# Patient Record
Sex: FEMALE | URBAN - METROPOLITAN AREA
[De-identification: names, ages, dates, MRNs, and addresses within clinical notes are randomized per-mention and may not be internally consistent; named-entity substitution may affect disease eponyms.]

---

## 2021-04-13 ENCOUNTER — HISTORICAL (OUTPATIENT)
Dept: ADMINISTRATIVE | Facility: HOSPITAL | Age: 51
End: 2021-04-13

## 2021-04-13 LAB — SARS-COV-2 RNA RESP QL NAA+PROBE: NOT DETECTED

## 2021-04-15 ENCOUNTER — HISTORICAL (OUTPATIENT)
Dept: SURGERY | Facility: HOSPITAL | Age: 51
End: 2021-04-15

## 2022-04-11 ENCOUNTER — HISTORICAL (OUTPATIENT)
Dept: ADMINISTRATIVE | Facility: HOSPITAL | Age: 52
End: 2022-04-11

## 2022-04-28 VITALS
BODY MASS INDEX: 28.13 KG/M2 | HEIGHT: 60 IN | WEIGHT: 143.31 LBS | SYSTOLIC BLOOD PRESSURE: 130 MMHG | DIASTOLIC BLOOD PRESSURE: 83 MMHG

## 2022-04-30 NOTE — H&P
Patient:   Marissa Hargrove             MRN: [id number]            FIN: [id number]               Age:   50 years     Sex:  Female     :  1970   Associated Diagnoses:   None   Author:   Dewayne Trejo MD      Patient presents today for surgery. There are no interval changes to formal history and physical examination performed in clinic.    Patient will undergo surgery as detailed in clinic note.    Dr. Fe Washburn

## 2022-05-04 NOTE — HISTORICAL [facility] CERNER
This is a historical note converted from [employer]. Formatting and pictures may have been removed.  Please reference Shireen for original formatting and attached multimedia. DATE OF SURGERY:?04/15/21  ?  PREOPERATIVE DIAGNOSES:  1.?right?ankle fracture  ?   POSTOPERATIVE DIAGNOSES:  ?   Same  ?   PROCEDURE: Open reduction and internal fixation of ankle fracture  ?  ATTENDING PHYSICIAN:?Dewayne Trejo MD  ?  RESIDENT PHYSICIANS: Dr Fe Washburn PGY3  ?  ANESTHESIA: Regional + General  ?  EBL: 20cc  ?  TOURNIQUET: 250 mmHg 48 min  ?  SPECIMEN: none  ?  IMPLANTS: [place] Variax ankle plate with 2.7mm locking and 3.5mm cortical screws.? Corie 2.7mm cortical screw.? Arthrex tightrope  ?  COMPLICATIONS: none  ?  DISPOSITION: To recovery room in stable condition  ?  ?  INDICATIONS FOR PROCEDURE:?Marissa Bryan?is a?50 Years?Female?who presented to our clinic for follow up assessment and definitive management of their ankle fracture.? While in clinic the risks, benefits, outcomes, and alternatives of conservative versus operative management were discussed with the patient. ?Informed consent was obtained for an open reduction and?internal fixation of the?ankle fracture.  ?  PROCEDURE IN DETAIL: ?The patient was brought into the operating room, positioned supine on the OR table. ?General anesthetic was administered by our anesthesia colleagues. ?The? ankle was prepped and draped in the usual fashion. ?A preoperative pause was performed to confirm the site and side for surgery. ?Preoperative antibiotics were given.?  ?  An incision was made over the distal fibular fracture site. ?This was carried down through subcutaneous tissue and down onto bone. ?Full-thickness flaps were elevated. ?Blunt dissection was made proximally to the fracture to protect the superficial peroneal nerve. ?The fracture was exposed and irrigated. ?It was cleaned with a combination of a?curette and small rongeurs.?  ?  FIBULAR FIXATION  ?  The  fibular fracture was anatomically reduced with a combination of lobster claw and pointed reduction forceps. ?A single 2.7 mm lag screw was placed across the fracture site in an anterior to posterior direction. ?Utilizing a zak variax fibular plate, we stabilized the fracture both proximally and distally with olives. ?Using intraoperative fluoroscopy, we confirmed that our fracture was anatomically reduced and that our hardware was in excellent position. ?We filled the plate distally with 2.7 mm locking screws and proximally with 3.5 mm locking screws.  ?  SYNDESMOSIS FIXATION  ?  Once we were satisfied with our fixation, we screened the ankle under intraoperative fluoroscopy. ?External rotation stress demonstrated widening of the syndesmosis as well as a medial clear space.? We placed an Arthrex TightRope across the syndesmosis under direct fluoroscopic visualization. ?With manual compression, the TightRope was tightened down into its final position. ?We tied the lateral aspect of the TightRope and cut the remaining sutures.?  ?  We then screened the ankle under intraoperative fluoroscopy to confirm that our fracture was reduced anatomically and that our hardware was in excellent position and well aligned. ?Under external rotation stress, there was no syndesmosis or medial clear space widening.?  ?  A thorough irrigation of the hardware and the wound was performed.? The subcutaneous tissue was closed with 2-0 Vicryl suture. ?Skin was closed with 3-0 nylon sutures.??A sterile dressing was applied and the patient was placed in a short-leg splint. ?General anesthetic was reversed. ?There were no intraoperative complications. ?Estimated blood loss was 20 cc. ?The patient was transferred to a hospital stretcher and brought to the recovery room in stable condition.  ?  POSTOPERATIVE PLAN: ?The patient will remain nonweightbearing on the ankle for a total of 6-8 weeks from the time of surgery. ?We will them?in clinic  in?2 weeks time to remove the splint, perform a wound check, remove sutures, and begin gentle range of motion exercises for the ankle.  ?